# Patient Record
(demographics unavailable — no encounter records)

---

## 2024-12-10 NOTE — PHYSICAL EXAM
[Partial] : partial cerumen impaction [1+] : 1+ [Normal Gait and Station] : normal gait and station [Normal muscle strength, symmetry and tone of facial, head and neck musculature] : normal muscle strength, symmetry and tone of facial, head and neck musculature [Normal] : no cervical lymphadenopathy [Exposed Vessel] : left anterior vessel not exposed [Wheezing] : no wheezing [Increased Work of Breathing] : no increased work of breathing with use of accessory muscles and retractions [FreeTextEntry6] : well healed surgical incision [FreeTextEntry7] : well healed surgical incision [FreeTextEntry9] : EAC narrowing.

## 2024-12-10 NOTE — ASSESSMENT
[FreeTextEntry1] : 4 year male with concerns for narrow EAC.  Previous ear tag removal.  Audio done today but mild CHL.  will need to clean ear out at next visit and recheck.  no infections so monitor for now. consider referral to otology. May need imaging at some point.

## 2024-12-10 NOTE — HISTORY OF PRESENT ILLNESS
[de-identified] : 4 year male with concerns for left ear canal being narrow.  has been his whole life.  h/o CMV.  no RAOM.  passed [facility].   no family hx of HL.  no drainage or pain from his ears.  had ear tag removal in the past on both sides.

## 2024-12-10 NOTE — DATA REVIEWED
[FreeTextEntry1] : Audiogram Audiogram was ordered for reported hx of hearing difficulties. This was personally reviewed and interpreted by me. Tymps: A/B Hearing: -4kHz on the right, left with mild CHL

## 2024-12-10 NOTE — REASON FOR VISIT
[Initial Consultation] : an initial consultation for [Mother] : mother [[employer] ] : provided by [employer]   [Interpreters_IDNumber] : [id number]

## 2025-03-11 NOTE — REASON FOR VISIT
[Language Line ] : provided by [employer]   [Subsequent Evaluation] : a subsequent evaluation for [Father] : father [Interpreters_IDNumber] : [id number] [Interpreters_FullName] : Luis [TWNoteComboBox1] : [place]

## 2025-03-11 NOTE — HISTORY OF PRESENT ILLNESS
[de-identified] : 3-11-25 hx of left ear canal narrowing no recent ear or throat infections epistaxis 2x per week, self-resolves  no snoring  intermit. nasal congestion- prn flonase when ill  no speech or hearing concerns    12-10-24 4 year male with concerns for left ear canal being narrow.  has been his whole life.  h/o CMV.  no RAOM.  passed [facility].   no family hx of HL.  no drainage or pain from his ears.  had ear tag removal in the past on both sides.

## 2025-03-11 NOTE — CONSULT LETTER
[Dear  ___] : Dear  [unfilled], [Consult Letter:] : I had the pleasure of evaluating your patient, [unfilled]. [Consult Closing:] : Thank you very much for allowing me to participate in the care of this patient.  If you have any questions, please do not hesitate to contact me. [Sincerely,] : Sincerely, [FreeTextEntry2] : Dr. Althea Ambrose

## 2025-03-11 NOTE — ASSESSMENT
[FreeTextEntry1] : 4 year male with concerns for narrow EAC.  Previous ear tag removal.  Audio done last time with mild CHL.  Maternal hx of CMV  Audio today shows HL on shu left and liikely CHL.   Cerumen impaction. Removed today.  Discussed not using q-tips and recommend olive or mineral oil 3 times a week to keep ear canal lubricated. Discussed that the ear is a self cleaning structure and just allow it clean itself. If wax builds up can try debrox. Once it gets impacted recommend return to get it cleaned out.    Referral to [facility]&S for 2 person audio,   Consider imaging pending eval. May have ME abnormalities. Likely referral to otology

## 2025-03-11 NOTE — DATA REVIEWED
[FreeTextEntry1] : Audiogram Audiogram was ordered for reported hx of hearing difficulties. This was personally reviewed and interpreted by me. Tymps: A/B Hearing: -4kHz on the right, left with mild CHL  Previous records reviewed

## 2025-03-11 NOTE — PHYSICAL EXAM
[1+] : 1+ [Normal Gait and Station] : normal gait and station [Normal muscle strength, symmetry and tone of facial, head and neck musculature] : normal muscle strength, symmetry and tone of facial, head and neck musculature [Normal] : no cervical lymphadenopathy [Complete] : complete cerumen impaction [Exposed Vessel] : left anterior vessel not exposed [Wheezing] : no wheezing [Increased Work of Breathing] : no increased work of breathing with use of accessory muscles and retractions [FreeTextEntry6] : well healed surgical incision [FreeTextEntry7] : well healed surgical incision [FreeTextEntry9] : EAC narrowing.

## 2025-03-11 NOTE — HISTORY OF PRESENT ILLNESS
[de-identified] : 3-11-25 hx of left ear canal narrowing no recent ear or throat infections epistaxis 2x per week, self-resolves  no snoring  intermit. nasal congestion- prn flonase when ill  no speech or hearing concerns    12-10-24 4 year male with concerns for left ear canal being narrow.  has been his whole life.  h/o CMV.  no RAOM.  passed [facility].   no family hx of HL.  no drainage or pain from his ears.  had ear tag removal in the past on both sides.

## 2025-03-11 NOTE — REASON FOR VISIT
[Language Line ] : provided by [employer]   [Subsequent Evaluation] : a subsequent evaluation for [Father] : father [Interpreters_IDNumber] : [id number] [Interpreters_FullName] : Luis [TWNoteComboBox1] : Stateless